# Patient Record
Sex: MALE | Race: WHITE
[De-identification: names, ages, dates, MRNs, and addresses within clinical notes are randomized per-mention and may not be internally consistent; named-entity substitution may affect disease eponyms.]

---

## 2018-06-20 ENCOUNTER — HOSPITAL ENCOUNTER (EMERGENCY)
Dept: HOSPITAL 17 - NEPD | Age: 68
Discharge: HOME | End: 2018-06-20
Payer: OTHER GOVERNMENT

## 2018-06-20 VITALS
OXYGEN SATURATION: 94 % | RESPIRATION RATE: 19 BRPM | SYSTOLIC BLOOD PRESSURE: 139 MMHG | HEART RATE: 100 BPM | DIASTOLIC BLOOD PRESSURE: 74 MMHG

## 2018-06-20 VITALS
RESPIRATION RATE: 19 BRPM | OXYGEN SATURATION: 94 % | HEART RATE: 110 BPM | SYSTOLIC BLOOD PRESSURE: 126 MMHG | DIASTOLIC BLOOD PRESSURE: 60 MMHG

## 2018-06-20 VITALS
HEART RATE: 104 BPM | RESPIRATION RATE: 18 BRPM | SYSTOLIC BLOOD PRESSURE: 157 MMHG | DIASTOLIC BLOOD PRESSURE: 72 MMHG | OXYGEN SATURATION: 96 % | TEMPERATURE: 98.5 F

## 2018-06-20 VITALS — RESPIRATION RATE: 16 BRPM

## 2018-06-20 DIAGNOSIS — Z88.8: ICD-10-CM

## 2018-06-20 DIAGNOSIS — M25.511: Primary | ICD-10-CM

## 2018-06-20 DIAGNOSIS — I25.10: ICD-10-CM

## 2018-06-20 DIAGNOSIS — Z99.81: ICD-10-CM

## 2018-06-20 DIAGNOSIS — Z87.891: ICD-10-CM

## 2018-06-20 DIAGNOSIS — R00.0: ICD-10-CM

## 2018-06-20 DIAGNOSIS — Z86.718: ICD-10-CM

## 2018-06-20 DIAGNOSIS — Z79.01: ICD-10-CM

## 2018-06-20 DIAGNOSIS — F01.50: ICD-10-CM

## 2018-06-20 DIAGNOSIS — R06.02: ICD-10-CM

## 2018-06-20 DIAGNOSIS — Z88.0: ICD-10-CM

## 2018-06-20 DIAGNOSIS — E78.5: ICD-10-CM

## 2018-06-20 DIAGNOSIS — I10: ICD-10-CM

## 2018-06-20 DIAGNOSIS — E11.40: ICD-10-CM

## 2018-06-20 DIAGNOSIS — R94.31: ICD-10-CM

## 2018-06-20 DIAGNOSIS — N40.0: ICD-10-CM

## 2018-06-20 DIAGNOSIS — I87.2: ICD-10-CM

## 2018-06-20 LAB
ALBUMIN SERPL-MCNC: 3 GM/DL (ref 3.4–5)
ALP SERPL-CCNC: 173 U/L (ref 45–117)
ALT SERPL-CCNC: 16 U/L (ref 12–78)
AST SERPL-CCNC: 16 U/L (ref 15–37)
BASOPHILS # BLD AUTO: 0.1 TH/MM3 (ref 0–0.2)
BASOPHILS NFR BLD: 0.6 % (ref 0–2)
BILIRUB SERPL-MCNC: 0.4 MG/DL (ref 0.2–1)
BUN SERPL-MCNC: 16 MG/DL (ref 7–18)
CALCIUM SERPL-MCNC: 8.3 MG/DL (ref 8.5–10.1)
CHLORIDE SERPL-SCNC: 103 MEQ/L (ref 98–107)
CREAT SERPL-MCNC: 1.05 MG/DL (ref 0.6–1.3)
EOSINOPHIL # BLD: 0.2 TH/MM3 (ref 0–0.4)
EOSINOPHIL NFR BLD: 2 % (ref 0–4)
ERYTHROCYTE [DISTWIDTH] IN BLOOD BY AUTOMATED COUNT: 16.5 % (ref 11.6–17.2)
GFR SERPLBLD BASED ON 1.73 SQ M-ARVRAT: 70 ML/MIN (ref 89–?)
GLUCOSE SERPL-MCNC: 79 MG/DL (ref 74–106)
HCO3 BLD-SCNC: 26.3 MEQ/L (ref 21–32)
HCT VFR BLD CALC: 39.4 % (ref 39–51)
HGB BLD-MCNC: 12.8 GM/DL (ref 13–17)
INR PPP: 1.4 RATIO
LYMPHOCYTES # BLD AUTO: 1.2 TH/MM3 (ref 1–4.8)
LYMPHOCYTES NFR BLD AUTO: 12.7 % (ref 9–44)
MAGNESIUM SERPL-MCNC: 1.5 MG/DL (ref 1.5–2.5)
MCH RBC QN AUTO: 27.3 PG (ref 27–34)
MCHC RBC AUTO-ENTMCNC: 32.6 % (ref 32–36)
MCV RBC AUTO: 83.7 FL (ref 80–100)
MONOCYTE #: 0.6 TH/MM3 (ref 0–0.9)
MONOCYTES NFR BLD: 6.9 % (ref 0–8)
NEUTROPHILS # BLD AUTO: 7 TH/MM3 (ref 1.8–7.7)
NEUTROPHILS NFR BLD AUTO: 77.8 % (ref 16–70)
PLATELET # BLD: 224 TH/MM3 (ref 150–450)
PMV BLD AUTO: 7.5 FL (ref 7–11)
PROT SERPL-MCNC: 7.5 GM/DL (ref 6.4–8.2)
PROTHROMBIN TIME: 14 SEC (ref 9.8–11.6)
RBC # BLD AUTO: 4.71 MIL/MM3 (ref 4.5–5.9)
SODIUM SERPL-SCNC: 140 MEQ/L (ref 136–145)
TROPONIN I SERPL-MCNC: (no result) NG/ML (ref 0.02–0.05)
WBC # BLD AUTO: 9.1 TH/MM3 (ref 4–11)

## 2018-06-20 PROCEDURE — 85025 COMPLETE CBC W/AUTO DIFF WBC: CPT

## 2018-06-20 PROCEDURE — 73030 X-RAY EXAM OF SHOULDER: CPT

## 2018-06-20 PROCEDURE — 71275 CT ANGIOGRAPHY CHEST: CPT

## 2018-06-20 PROCEDURE — 83735 ASSAY OF MAGNESIUM: CPT

## 2018-06-20 PROCEDURE — 83880 ASSAY OF NATRIURETIC PEPTIDE: CPT

## 2018-06-20 PROCEDURE — 82550 ASSAY OF CK (CPK): CPT

## 2018-06-20 PROCEDURE — 84484 ASSAY OF TROPONIN QUANT: CPT

## 2018-06-20 PROCEDURE — 85610 PROTHROMBIN TIME: CPT

## 2018-06-20 PROCEDURE — 99285 EMERGENCY DEPT VISIT HI MDM: CPT

## 2018-06-20 PROCEDURE — 85730 THROMBOPLASTIN TIME PARTIAL: CPT

## 2018-06-20 PROCEDURE — 93005 ELECTROCARDIOGRAM TRACING: CPT

## 2018-06-20 PROCEDURE — 80053 COMPREHEN METABOLIC PANEL: CPT

## 2018-06-20 PROCEDURE — 96374 THER/PROPH/DIAG INJ IV PUSH: CPT

## 2018-06-20 PROCEDURE — 71045 X-RAY EXAM CHEST 1 VIEW: CPT

## 2018-06-20 PROCEDURE — 83690 ASSAY OF LIPASE: CPT

## 2018-06-20 NOTE — PD
Physical Exam


Narrative


Received sign out from previous team to follow up CTA to r/o PE.





67yo M here with history of DVT on eliquis [facility].  CTA 

negative for pulmonary embolism.  Pt denies any chest pain.  Labs reviewed, no 

leukocytosis.  CMP unremarkable.  Troponin negative.  BNP 26.  Pt said he has 

right shoulder pain for 1 week.  The pain is more right scapula and worst with 

movement.  Impression is more musculoskeletal.  Pt has good range of motion of 

right shoulder.  Sensation intact.  Distal pulses intact.  Xray right shoulder 

showed no acute abnormality.  There is hypertrophic change at the 

acromioclavicular joint and around the greater tubercle.  Pt given toradol for 

pain.  Return precautions given.





Data


Data


Last Documented VS





Vital Signs








  Date Time  Temp Pulse Resp B/P (MAP) Pulse Ox O2 Delivery O2 Flow Rate FiO2


 


6/20/18 20:18        


 


6/20/18 20:04   16     


 


6/20/18 19:06  100   94 Room Air  


 


6/20/18 16:09       3.00 


 


6/20/18 14:53 98.5       








Orders





 Orders


Shoulder, Complete (>2vws) (6/20/18 15:01)


Electrocardiogram (6/20/18 15:56)


B-Type Natriuretic Peptide (6/20/18 15:56)


Ckmb (Isoenzyme) Profile (6/20/18 15:56)


Complete Blood Count With Diff (6/20/18 15:56)


Comprehensive Metabolic Panel (6/20/18 15:56)


Magnesium (Mg) (6/20/18 15:56)


Prothrombin Time / Inr (Pt) (6/20/18 15:56)


Act Partial Throm Time (Ptt) (6/20/18 15:56)


Troponin I (6/20/18 15:56)


Lipase (6/20/18 15:56)


Chest, Single Ap (6/20/18 15:56)


Ecg Monitoring (6/20/18 15:56)


Iv Access Insert/Monitor (6/20/18 15:56)


Oximetry (6/20/18 15:56)


Sodium Chloride 0.9% Flush (Ns Flush) (6/20/18 16:00)


Ct Pulmonary Angiogram (6/20/18 15:56)


Iohexol 350 Inj (Omnipaque 350 Inj) (6/20/18 17:47)


Ketorolac Inj (Toradol Inj) (6/20/18 18:45)


Diazepam (Valium) (6/20/18 19:00)


Ed Discharge Order (6/20/18 20:13)





Labs





Laboratory Tests








Test


  6/20/18


16:05


 


White Blood Count 9.1 TH/MM3 


 


Red Blood Count 4.71 MIL/MM3 


 


Hemoglobin 12.8 GM/DL 


 


Hematocrit 39.4 % 


 


Mean Corpuscular Volume 83.7 FL 


 


Mean Corpuscular Hemoglobin 27.3 PG 


 


Mean Corpuscular Hemoglobin


Concent 32.6 % 


 


 


Red Cell Distribution Width 16.5 % 


 


Platelet Count 224 TH/MM3 


 


Mean Platelet Volume 7.5 FL 


 


Neutrophils (%) (Auto) 77.8 % 


 


Lymphocytes (%) (Auto) 12.7 % 


 


Monocytes (%) (Auto) 6.9 % 


 


Eosinophils (%) (Auto) 2.0 % 


 


Basophils (%) (Auto) 0.6 % 


 


Neutrophils # (Auto) 7.0 TH/MM3 


 


Lymphocytes # (Auto) 1.2 TH/MM3 


 


Monocytes # (Auto) 0.6 TH/MM3 


 


Eosinophils # (Auto) 0.2 TH/MM3 


 


Basophils # (Auto) 0.1 TH/MM3 


 


CBC Comment DIFF FINAL 


 


Differential Comment  


 


Prothrombin Time 14.0 SEC 


 


Prothromb Time International


Ratio 1.4 RATIO 


 


 


Activated Partial


Thromboplast Time 34.3 SEC 


 


 


Blood Urea Nitrogen 16 MG/DL 


 


Creatinine 1.05 MG/DL 


 


Random Glucose 79 MG/DL 


 


Total Protein 7.5 GM/DL 


 


Albumin 3.0 GM/DL 


 


Calcium Level 8.3 MG/DL 


 


Magnesium Level 1.5 MG/DL 


 


Alkaline Phosphatase 173 U/L 


 


Aspartate Amino Transf


(AST/SGOT) 16 U/L 


 


 


Alanine Aminotransferase


(ALT/SGPT) 16 U/L 


 


 


Total Bilirubin 0.4 MG/DL 


 


Sodium Level 140 MEQ/L 


 


Potassium Level 3.4 MEQ/L 


 


Chloride Level 103 MEQ/L 


 


Carbon Dioxide Level 26.3 MEQ/L 


 


Anion Gap 11 MEQ/L 


 


Estimat Glomerular Filtration


Rate 70 ML/MIN 


 


 


Total Creatine Kinase 49 U/L 


 


Troponin I


  LESS THAN 0.02


NG/ML


 


B-Type Natriuretic Peptide 26 PG/ML 


 


Lipase 163 U/L 











[facility]


Supervised Visit with CARLEE:  No


Diagnosis





 Primary Impression:  


 Shoulder pain, right


 Qualified Codes:  M25.511 - Pain in right shoulder


Patient Instructions:  General Instructions


Departure Forms:  Tests/Procedures





***Additional Instruction:  


Please follow up with your primary care physician for further evaluation of 

your shoulder pain.  Return to the ED if symptoms worsen.


***Med/Other Pt SpecificInfo:  Prescription(s) given


Scripts


Cyclobenzaprine (Flexeril) 5 Mg Tab


5 MG PO TID Y for PAIN SCALE 1 TO 4, #10 TAB 0 Refills


   Prov: Alice Inman DO         6/20/18 


Acetaminophen (Tylenol) 325 Mg Tab


325 MG PO Q4H Y for PAIN SCALE 1 TO 4, #20 TAB 0 Refills


   Prov: Alice Inman DO         6/20/18


Disposition:  01 DISCHARGE HOME


Condition:  Stable











Alice Inman DO Jun 20, 2018 17:09

## 2018-06-20 NOTE — RADRPT
EXAM DATE:  6/20/2018 3:33 PM EDT

AGE/SEX:        68 years / Male



INDICATIONS:  Pain and limited ROM in right shoulder. No known injury. 



CLINICAL DATA:  This is the patient's initial encounter. Patient reports that signs and symptoms have
 been present for 1 week and indicates a pain score of 8/10. 

                                                                          

MEDICAL/SURGICAL HISTORY:       None. None.



COMPARISON:      No prior exams available for comparison. 





FINDINGS:  

No fracture is seen. The glenohumeral joint is normally aligned. The acromioclavicular joint is amrit
lly aligned. There is hypertrophic change at the acromioclavicular joint. There is hypertrophic tristan
e seen around the greater tubercle.



CONCLUSION: 

No acute abnormality seen. There is hypertrophic change at the acromioclavicular joint and around the
 greater tubercle.



 







Electronically signed by: Kanu Burr MD  6/20/2018 4:17 PM EDT

## 2018-06-20 NOTE — RADRPT
EXAM DATE:  6/20/2018 4:39 PM EDT

AGE/SEX:        68 years / Male



INDICATIONS:  Shortness of breath. 



CLINICAL DATA:  This is the patient's initial encounter. Patient reports that signs and symptoms have
 been present for 1 day and indicates a pain score of 0/10. 

                                                                          

MEDICAL/SURGICAL HISTORY:       Non-responsive. Non-responsive.



COMPARISON:      No prior exams available for comparison. 





FINDINGS:  

The heart size is normal. Lungs are grossly clear. There is elevation of the left hemidiaphragm. This
 configuration is stable.



CONCLUSION: 

No acute abnormality is seen.



Electronically signed by: Kanu Burr MD  6/20/2018 5:07 PM EDT

## 2018-06-20 NOTE — PD
HPI


Chief Complaint:  Pain: Acute or Chronic


Time Seen by Provider:  15:33


Travel History


International Travel<30 days:  No


Contact w/Intl Traveler<30days:  No


Traveled to known affect area:  No





History of Present Illness


HPI


Patient is a 68-year-old male who was [facility] 

[facility] possible [facility].  As per patient, he has been having right-sided 

shoulder pain which radiates to his left shoulder and throughout his chest for 

the past week, reports that symptoms are getting progressively worse.  Denies 

any injury or trauma to the shoulders.  reports that [facility] 

[facility] and was told to go to the emergency room to be evaluated 

for possible pulmonary embolism.  Patient reports history of a DVT to his right 

lower extremity, he is currently taking Eliquis twice daily for this.  Patient 

reports that he is oxygen dependent, he uses 4 L of nasal cannula at all times, 

reports that he is noncompliant with this and does not use oxygen at all times.

  Reports a past history of smoking.  Patient with no chest pain at this time, 

he does endorse that he has some shortness of breath.





After reviewing [facility] the [facility], patient is currently 

taking Eliquis for his DVT, the plan was to change him to Xarelto as his DVT 

was not improving.  He has not started on the Xarelto yet, he was transferred 

to the hospital for evaluation of possible pulmonary emboli.





[facility]


Past Medical History


*** Narrative Medical


Patient with past medical history positive for diabetes mellitus, anemia, 

fungal dermatitis, Estrada's esophagitis, BPH, peripheral venous insufficiency, 

coronary arteriosclerosis, restrictive lung disease, inflammatory arthritis, 

vascular dementia, obesity, arrhythmia, neuropathy, hepatosplenomegaly, 

meningioma, obesity, hyperlipidemia, DVT, tinnitus, hypertension, GERD, sleep 

apnea





Past Surgical History


*** Narrative Surgical


Past surgical history significant for left total knee replacement,





Social History


Alcohol Use:  No


Tobacco Use:  No


Substance Use:  No





Allergies-Medications


(Allergen,Severity, Reaction):  


Coded Allergies:  


     Penicillins (Verified  Allergy, Severe, Swelling, 6/20/18)


     doxazosin (Verified  Adverse Reaction, Intermediate, Dizziness, 6/20/18)


     methotrexate (Verified  Adverse Reaction, Intermediate, ABDOMINAL PAIN, 6/ 20/18)





Review of Systems


General / Constitutional:  No: Fever


Eyes:  No: Visual changes


HENT:  No: Headaches


Cardiovascular:  No: Chest Pain or Discomfort


Respiratory:  Positive: Shortness of Breath


Gastrointestinal:  No: Abdominal Pain


Genitourinary:  No: Dysuria


Musculoskeletal:  Positive: Other (Bilateral shoulder pain), No: Pain


Skin:  No Rash


Neurologic:  No: Weakness


Psychiatric:  No: Depression


Endocrine:  No: Polydipsia


Hematologic/Lymphatic:  No: Easy Bruising





Physical Exam


Narrative


GENERAL: Morbidly obese male in no acute distress


SKIN: Focused skin assessment warm/dry.


HEAD: Atraumatic. Normocephalic. 


EYES: Pupils equal and round. No scleral icterus. No injection or drainage. 


ENT: No nasal bleeding or discharge.  Mucous membranes pink and moist.


NECK: Trachea midline. No JVD. 


CARDIOVASCULAR: Sinus tachycardia.  No murmur appreciated.


RESPIRATORY: No accessory muscle use. Clear to auscultation. Breath sounds 

equal bilaterally. 


GASTROINTESTINAL: Abdomen soft, non-tender, nondistended. Hepatic and splenic 

margins not palpable. 


MUSCULOSKELETAL: No obvious deformities. No clubbing.  No cyanosis.  +3 pedal 

edema bilaterally. 


NEUROLOGICAL: Awake and alert. No obvious cranial nerve deficits.  Motor 

grossly within normal limits. Normal speech.


PSYCHIATRIC: Appropriate mood and affect; insight and judgment normal.





Data


Data


Last Documented VS





Vital Signs








  Date Time  Temp Pulse Resp B/P (MAP) Pulse Ox O2 Delivery O2 Flow Rate FiO2


 


6/20/18 16:09  110 19 126/60 (82) 94 Nasal Cannula 3.00 


 


6/20/18 14:53 98.5       








Orders





 Orders


Shoulder, Complete (>2vws) (6/20/18 15:01)


Electrocardiogram (6/20/18 15:56)


B-Type Natriuretic Peptide (6/20/18 15:56)


Ckmb (Isoenzyme) Profile (6/20/18 15:56)


Complete Blood Count With Diff (6/20/18 15:56)


Comprehensive Metabolic Panel (6/20/18 15:56)


Magnesium (Mg) (6/20/18 15:56)


Prothrombin Time / Inr (Pt) (6/20/18 15:56)


Act Partial Throm Time (Ptt) (6/20/18 15:56)


Troponin I (6/20/18 15:56)


Lipase (6/20/18 15:56)


Chest, Single Ap (6/20/18 15:56)


Ecg Monitoring (6/20/18 15:56)


Iv Access Insert/Monitor (6/20/18 15:56)


Oximetry (6/20/18 15:56)


Sodium Chloride 0.9% Flush (Ns Flush) (6/20/18 16:00)


Ct Pulmonary Angiogram (6/20/18 15:56)





Labs





Laboratory Tests








Test


  6/20/18


16:05


 


White Blood Count 9.1 TH/MM3 


 


Red Blood Count 4.71 MIL/MM3 


 


Hemoglobin 12.8 GM/DL 


 


Hematocrit 39.4 % 


 


Mean Corpuscular Volume 83.7 FL 


 


Mean Corpuscular Hemoglobin 27.3 PG 


 


Mean Corpuscular Hemoglobin


Concent 32.6 % 


 


 


Red Cell Distribution Width 16.5 % 


 


Platelet Count 224 TH/MM3 


 


Mean Platelet Volume 7.5 FL 


 


Neutrophils (%) (Auto) 77.8 % 


 


Lymphocytes (%) (Auto) 12.7 % 


 


Monocytes (%) (Auto) 6.9 % 


 


Eosinophils (%) (Auto) 2.0 % 


 


Basophils (%) (Auto) 0.6 % 


 


Neutrophils # (Auto) 7.0 TH/MM3 


 


Lymphocytes # (Auto) 1.2 TH/MM3 


 


Monocytes # (Auto) 0.6 TH/MM3 


 


Eosinophils # (Auto) 0.2 TH/MM3 


 


Basophils # (Auto) 0.1 TH/MM3 


 


CBC Comment DIFF FINAL 


 


Differential Comment  


 


Prothrombin Time 14.0 SEC 


 


Prothromb Time International


Ratio 1.4 RATIO 


 


 


Activated Partial


Thromboplast Time 34.3 SEC 


 











MDM


Medical Decision Making


Medical Screen Exam Complete:  Yes


Emergency Medical Condition:  Yes


Medical Record Reviewed:  Yes


Interpretation(s)


EKG at 1602: Sinus tach at 103bpm, qt/qtc: 341/401, no acute st or t wave 

changes





Vital Signs








  Date Time  Temp Pulse Resp B/P (MAP) Pulse Ox O2 Delivery O2 Flow Rate FiO2


 


6/20/18 14:53 98.5 104 18 157/72 (100) 96   








Differential Diagnosis


DVT, PE, ACS, Electrolyte abnormality


Narrative Course


During the course of the patients emergency department visit, the patients 

history, examination, and differential diagnosis were reviewed with the 

patient. The patient was placed on a cardiac monitor with oximetry and frequent 

blood pressure monitoring. The patient had an IV access obtained and blood work 

sent for analysis. 





Patient signed out to care of Dr Inman at change of shift











Yudy Kearns DO Jun 20, 2018 16:03

## 2018-06-20 NOTE — RADRPT
EXAM DATE:  6/20/2018 5:39 PM EDT

AGE/SEX:        68 years / Male



INDICATIONS:  Right sided chest pain.



CLINICAL DATA:  This is the patient's initial encounter. Patient reports that signs and symptoms have
 been present for 1 day and indicates a pain score of 4/10. 

                                                                          

MEDICAL/SURGICAL HISTORY:   None. None.



RADIATION DOSE:  10.66 CTDI (mGy)









COMPARISON:      No prior exams available for comparison. 





TECHNIQUE:  Volumetric scanning was performed using a multi-row detector CT scanner during bolus infu
klarissa of 75 ml Omnipaque 350 (iohexol)  nonionic water-soluble contrast as a single exam dose. The [place]
a was post processed with a variety of visualization algorithms including full volume maximum intensi
ty projection and sliding thin slab reformation. Using automated exposure control and adjustment of t
he mA and/or kV according to patient size, radiation dose was kept as low as reasonably achievable to
 obtain optimal diagnostic quality images.  DICOM format image data is available electronically for r
eview and comparison.  



FINDINGS:  

Pulmonary Arteries:  No filling defects are seen in the pulmonary arteries out to the subsegmental ve
ssels.  The left and right pulmonary arteries are normal in diameter.

Lung:  No infiltrates seen. Mild bibasilar atelectasis

Effusion:  None.

Mediastinum:  No evidence of mediastinal or hilar adenopathy.

Other:  The axilla is unremarkable.



CONCLUSION:

1.  The study is negative for pulmonary embolism.



Electronically signed by: Dane Mckeon MD  6/20/2018 5:47 PM EDT

## 2018-06-21 NOTE — EKG
Date Performed: 06/20/2018       Time Performed: 16:02:42

 

PTAGE:      68 years

 

EKG:      SINUS TACHYCARDIA INFERIOR MYOCARDIAL INFARCTION ABNORMAL ECG 

 

NO PREVIOUS TRACING            

 

DOCTOR:   Chata Herny  Interpretating Date/Time  06/21/2018 17:45:29